# Patient Record
Sex: MALE | RURAL
[De-identification: names, ages, dates, MRNs, and addresses within clinical notes are randomized per-mention and may not be internally consistent; named-entity substitution may affect disease eponyms.]

---
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
## 2023-01-17 NOTE — PROCEDURE: HIGH RISK MEDICATION MONITORING
----- Message from Krystal Gay MA sent at 10/31/2022  2:23 PM EDT -----  Regarding: PA  PA expires 2/3/23- need to renew and verify coverage. Q 6 weeks.  IV 12/20/22 Fluconazole Counseling:  Patient counseled regarding adverse effects of fluconazole including but not limited to headache, diarrhea, nausea, upset stomach, liver function test abnormalities, taste disturbance, and stomach pain.  There is a rare possibility of liver failure that can occur when taking fluconazole.  The patient understands that monitoring of LFTs and kidney function test may be required, especially at baseline. The patient verbalized understanding of the proper use and possible adverse effects of fluconazole.  All of the patient's questions and concerns were addressed.

## 2023-08-31 NOTE — PROCEDURE: HIGH RISK MEDICATION MONITORING
Alert and oriented to person, place and time Drysol Pregnancy And Lactation Text: This medication is considered safe during pregnancy and breast feeding.